# Patient Record
Sex: MALE | NOT HISPANIC OR LATINO | URBAN - METROPOLITAN AREA
[De-identification: names, ages, dates, MRNs, and addresses within clinical notes are randomized per-mention and may not be internally consistent; named-entity substitution may affect disease eponyms.]

---

## 2021-11-24 ENCOUNTER — EMERGENCY (EMERGENCY)
Facility: HOSPITAL | Age: 41
LOS: 1 days | Discharge: HOME | End: 2021-11-24
Admitting: EMERGENCY MEDICINE
Payer: COMMERCIAL

## 2021-11-24 VITALS
SYSTOLIC BLOOD PRESSURE: 127 MMHG | HEIGHT: 68 IN | TEMPERATURE: 98 F | RESPIRATION RATE: 18 BRPM | OXYGEN SATURATION: 98 % | DIASTOLIC BLOOD PRESSURE: 76 MMHG | HEART RATE: 82 BPM | WEIGHT: 130.95 LBS

## 2021-11-24 DIAGNOSIS — S60.042A CONTUSION OF LEFT RING FINGER WITHOUT DAMAGE TO NAIL, INITIAL ENCOUNTER: ICD-10-CM
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
## 2021-11-24 DIAGNOSIS — S62.635A DISPLACED FRACTURE OF DISTAL PHALANX OF LEFT RING FINGER, INITIAL ENCOUNTER FOR CLOSED FRACTURE: ICD-10-CM

## 2021-11-24 PROCEDURE — 73130 X-RAY EXAM OF HAND: CPT | Mod: 26,LT

## 2021-11-24 PROCEDURE — 99284 EMERGENCY DEPT VISIT MOD MDM: CPT | Mod: 25

## 2021-11-24 RX ORDER — CEPHALEXIN 500 MG
1 CAPSULE ORAL
Qty: 56 | Refills: 0
Start: 2021-11-24 | End: 2021-12-07

## 2021-11-24 RX ORDER — IBUPROFEN 200 MG
1 TABLET ORAL
Qty: 28 | Refills: 0
Start: 2021-11-24 | End: 2021-11-30

## 2021-11-24 RX ORDER — KETOROLAC TROMETHAMINE 30 MG/ML
30 SYRINGE (ML) INJECTION ONCE
Refills: 0 | Status: DISCONTINUED | OUTPATIENT
Start: 2021-11-24 | End: 2021-11-24

## 2021-11-24 RX ORDER — SACCHAROMYCES BOULARDII 250 MG
1 POWDER IN PACKET (EA) ORAL
Qty: 60 | Refills: 0
Start: 2021-11-24 | End: 2021-12-23

## 2021-11-24 RX ORDER — OXYCODONE AND ACETAMINOPHEN 5; 325 MG/1; MG/1
1 TABLET ORAL ONCE
Refills: 0 | Status: DISCONTINUED | OUTPATIENT
Start: 2021-11-24 | End: 2021-11-24

## 2021-11-24 RX ADMIN — Medication 30 MILLIGRAM(S): at 20:42

## 2021-11-24 RX ADMIN — Medication 1 TABLET(S): at 20:42

## 2021-11-24 RX ADMIN — OXYCODONE AND ACETAMINOPHEN 1 TABLET(S): 5; 325 TABLET ORAL at 20:41

## 2021-11-24 NOTE — ED PROVIDER NOTE - CLINICAL SUMMARY MEDICAL DECISION MAKING FREE TEXT BOX
40 yo m pw 4th L digit laceration subungual sudden onset crushed by door 2 hr pta to the ED with bleeding controlled with pressure, no weakness or numbness.    +L 4th tip subungual laceration, full ROM in all joints of the hand 42 yo m pw 4th L digit laceration subungual sudden onset crushed by door 2 hr pta to the ED with bleeding controlled with pressure, no weakness or numbness.    +L 4th tip subungual laceration, full ROM in all joints of the hand    lac repaired and pt splinted by Dr Abel

## 2021-11-24 NOTE — ED PROVIDER NOTE - PHYSICAL EXAMINATION
Physical Exam    Vital Signs: I have reviewed the initial vital signs.  Constitutional: well-nourished, appears stated age, no acute distress  Cardiovascular: regular rate, regular rhythm, well-perfused extremities, radial pulse +2 and equal b/l  Musculoskeletal: +L 4th tip subungual laceration, full ROM in all joints of the hand  Integumentary: warm, dry, no rash  Neurologic: extremities’ motor and sensory functions grossly intact  Psychiatric: A&Ox3

## 2021-11-24 NOTE — ED PROVIDER NOTE - CARE PROVIDER_API CALL
Royce Abel)  Plastic Surgery; Surgery  [address] [place] [id number]  Phone: [phone]  Fax: [phone]  Follow Up Time: 1-3 Days

## 2021-11-24 NOTE — ED ADULT TRIAGE NOTE - CHIEF COMPLAINT QUOTE
Patient to ED with crush injury to left 4th digit.  Sent from [facility] for eval.  Patient rec'd tetanus shot

## 2021-11-24 NOTE — ED PROVIDER NOTE - PATIENT PORTAL LINK FT
You can access the FollowMyHealth Patient Portal offered by [facility] by registering at the following website: [email or web address]. By joining [facility]’s FollowMyHealth portal, you will also be able to view your health information using other applications (apps) compatible with our system.

## 2021-11-24 NOTE — ED ADULT NURSE NOTE - NSIMPLEMENTINTERV_GEN_ALL_ED
Implemented All Universal Safety Interventions:  [place] to call system. Call bell, personal items and telephone within reach. Instruct patient to call for assistance. Room bathroom lighting operational. Non-slip footwear when patient is off stretcher. Physically safe environment: no spills, clutter or unnecessary equipment. Stretcher in lowest position, wheels locked, appropriate side rails in place.

## 2021-11-24 NOTE — ED ADULT NURSE NOTE - OBJECTIVE STATEMENT
Left fourth finger shut in door; avulsion to distal end of finger. Minimal bleeding. Lidocaine injected and tdap updated at [facility] prior to arrival.

## 2021-11-24 NOTE — ED PROVIDER NOTE - OBJECTIVE STATEMENT
40 yo m pw 4th L digit laceration subungual sudden onset crushed by door 2 hr pta to the ED with bleeding controlled with pressure, no weakness or numbness.     I have reviewed available current nursing and previous documentation of past medical, surgical, family, and/or social history.

## 2021-11-24 NOTE — ED PROVIDER NOTE - WET READ LAUNCH FT
Your glucose came back normal.  Your LDL or bad cholesterol is a little high increased exercise and low chol diet can help with this. Your white cell count is also a little elevated. I would like to recheck this in a month. Let me know if you have any questions.     Dr Monisha King There is 1 Wet Read(s) to document. There are no Wet Read(s) to document.

## 2024-10-17 NOTE — ED ADULT TRIAGE NOTE - BP NONINVASIVE DIASTOLIC (MM HG)
76
PAST SURGICAL HISTORY:  H/O hernia repair     History of percutaneous angioplasty     S/P scrotal varicocelectomy